# Patient Record
Sex: FEMALE | Race: WHITE | NOT HISPANIC OR LATINO | Employment: PART TIME | RURAL
[De-identification: names, ages, dates, MRNs, and addresses within clinical notes are randomized per-mention and may not be internally consistent; named-entity substitution may affect disease eponyms.]

---

## 2019-04-17 PROBLEM — L30.9 PERIORBITAL DERMATITIS: Status: ACTIVE | Noted: 2019-04-17

## 2019-04-18 PROBLEM — T36.8X5A VANCOMYCIN ADVERSE REACTION: Chronic | Status: ACTIVE | Noted: 2019-04-18

## 2019-04-18 PROBLEM — Z91.040 LATEX ALLERGY: Status: ACTIVE | Noted: 2019-04-18

## 2023-08-11 ENCOUNTER — OFFICE VISIT (OUTPATIENT)
Dept: FAMILY MEDICINE | Facility: CLINIC | Age: 33
End: 2023-08-11
Payer: COMMERCIAL

## 2023-08-11 VITALS
WEIGHT: 149.81 LBS | OXYGEN SATURATION: 99 % | DIASTOLIC BLOOD PRESSURE: 83 MMHG | HEIGHT: 60 IN | RESPIRATION RATE: 18 BRPM | HEART RATE: 64 BPM | SYSTOLIC BLOOD PRESSURE: 130 MMHG | BODY MASS INDEX: 29.41 KG/M2 | TEMPERATURE: 99 F

## 2023-08-11 DIAGNOSIS — B37.0 THRUSH: Primary | ICD-10-CM

## 2023-08-11 PROBLEM — G43.019 INTRACTABLE MIGRAINE WITHOUT AURA AND WITHOUT STATUS MIGRAINOSUS: Status: ACTIVE | Noted: 2022-05-24

## 2023-08-11 PROBLEM — R60.0 EXTREMITY EDEMA: Status: ACTIVE | Noted: 2022-05-24

## 2023-08-11 PROBLEM — N32.9 BLADDER DISORDER: Status: ACTIVE | Noted: 2023-08-11

## 2023-08-11 PROBLEM — R55 SYNCOPE AND COLLAPSE: Status: ACTIVE | Noted: 2022-05-24

## 2023-08-11 PROBLEM — R32 URINARY INCONTINENCE: Status: ACTIVE | Noted: 2022-05-24

## 2023-08-11 PROBLEM — R19.5 LOOSE STOOLS: Status: ACTIVE | Noted: 2022-05-24

## 2023-08-11 PROBLEM — M19.90 ARTHRITIS: Status: ACTIVE | Noted: 2023-08-11

## 2023-08-11 PROBLEM — R11.0 NAUSEA: Status: ACTIVE | Noted: 2022-05-24

## 2023-08-11 PROBLEM — I95.1 ORTHOSTATIC HYPOTENSION: Status: ACTIVE | Noted: 2022-05-24

## 2023-08-11 PROBLEM — G44.229 CHRONIC TENSION-TYPE HEADACHE, NOT INTRACTABLE: Status: ACTIVE | Noted: 2022-05-24

## 2023-08-11 PROBLEM — E55.9 VITAMIN D DEFICIENCY: Status: ACTIVE | Noted: 2022-05-24

## 2023-08-11 PROBLEM — R10.13 EPIGASTRIC PAIN: Status: ACTIVE | Noted: 2022-05-24

## 2023-08-11 PROBLEM — N39.3 SUI (STRESS URINARY INCONTINENCE, FEMALE): Status: ACTIVE | Noted: 2022-05-02

## 2023-08-11 PROBLEM — F41.8 DEPRESSION WITH ANXIETY: Status: ACTIVE | Noted: 2022-05-24

## 2023-08-11 PROBLEM — I47.10 SVT (SUPRAVENTRICULAR TACHYCARDIA): Status: ACTIVE | Noted: 2022-05-24

## 2023-08-11 PROBLEM — R53.82 CHRONIC FATIGUE: Status: ACTIVE | Noted: 2022-05-24

## 2023-08-11 PROBLEM — F33.0 MDD (MAJOR DEPRESSIVE DISORDER), RECURRENT EPISODE, MILD: Status: ACTIVE | Noted: 2022-05-24

## 2023-08-11 PROBLEM — F41.9 ANXIETY: Status: ACTIVE | Noted: 2022-08-16

## 2023-08-11 PROBLEM — K31.89 REACTIVE GASTROPATHY: Status: ACTIVE | Noted: 2022-05-24

## 2023-08-11 PROBLEM — Z00.01 ENCOUNTER FOR GENERAL ADULT MEDICAL EXAMINATION WITH ABNORMAL FINDINGS: Status: ACTIVE | Noted: 2022-05-24

## 2023-08-11 PROBLEM — G90.1 DYSAUTONOMIA: Status: ACTIVE | Noted: 2022-05-24

## 2023-08-11 PROBLEM — J35.01 CHRONIC TONSILLITIS: Status: ACTIVE | Noted: 2022-05-24

## 2023-08-11 PROCEDURE — 99203 OFFICE O/P NEW LOW 30 MIN: CPT | Mod: ,,, | Performed by: NURSE PRACTITIONER

## 2023-08-11 PROCEDURE — 99203 PR OFFICE/OUTPT VISIT, NEW, LEVL III, 30-44 MIN: ICD-10-PCS | Mod: ,,, | Performed by: NURSE PRACTITIONER

## 2023-08-11 RX ORDER — HYDROCHLOROTHIAZIDE 12.5 MG/1
1 CAPSULE ORAL EVERY MORNING
COMMUNITY
Start: 2023-07-12

## 2023-08-11 RX ORDER — SERTRALINE HYDROCHLORIDE 50 MG/1
50 TABLET, FILM COATED ORAL EVERY MORNING
COMMUNITY
Start: 2023-03-30

## 2023-08-11 RX ORDER — HYDROCHLOROTHIAZIDE 12.5 MG/1
12.5 CAPSULE ORAL EVERY MORNING
COMMUNITY
Start: 2023-07-12

## 2023-08-11 RX ORDER — UBROGEPANT 50 MG/1
50 TABLET ORAL DAILY PRN
COMMUNITY
Start: 2022-12-13

## 2023-08-11 RX ORDER — ONDANSETRON 8 MG/1
8 TABLET, ORALLY DISINTEGRATING ORAL EVERY 8 HOURS PRN
COMMUNITY
Start: 2023-04-13

## 2023-08-11 RX ORDER — FLUDROCORTISONE ACETATE 0.1 MG/1
1 TABLET ORAL EVERY MORNING
COMMUNITY
Start: 2023-08-07

## 2023-08-11 RX ORDER — ERENUMAB-AOOE 140 MG/ML
INJECTION, SOLUTION SUBCUTANEOUS
COMMUNITY

## 2023-08-11 RX ORDER — FREMANEZUMAB-VFRM 225 MG/1.5ML
INJECTION SUBCUTANEOUS
COMMUNITY
Start: 2023-06-14

## 2023-08-11 RX ORDER — FLUCONAZOLE 150 MG/1
TABLET ORAL
Qty: 3 TABLET | Refills: 1 | Status: SHIPPED | OUTPATIENT
Start: 2023-08-11

## 2023-08-11 RX ORDER — FREMANEZUMAB-VFRM 225 MG/1.5ML
225 INJECTION SUBCUTANEOUS
COMMUNITY
Start: 2023-08-07

## 2023-08-11 NOTE — PROGRESS NOTES
Subjective:       Patient ID: Qiana Waite is a 33 y.o. female.    Chief Complaint: Thrush    Presents to clinic as above. Takes chronic steroids for dysautonomia. She gets thrush several times per year as a result.      Review of Systems   Constitutional: Negative.    HENT:  Positive for sore throat.    Respiratory: Negative.     Cardiovascular: Negative.           Reviewed family, medical, surgical, and social history.    Objective:      /83 (BP Location: Left arm, Patient Position: Sitting, BP Method: Medium (Automatic))   Pulse 64   Temp 98.7 °F (37.1 °C) (Oral)   Resp 18   Ht 5' (1.524 m)   Wt 67.9 kg (149 lb 12.8 oz)   SpO2 99%   BMI 29.26 kg/m²   Physical Exam  Vitals and nursing note reviewed.   Constitutional:       General: She is not in acute distress.     Appearance: Normal appearance. She is not ill-appearing, toxic-appearing or diaphoretic.   HENT:      Head: Normocephalic.      Mouth/Throat:      Mouth: Mucous membranes are moist.      Pharynx: Posterior oropharyngeal erythema present.      Comments: Mild redness to posterior pharynx on left. Adherent white patches on buccal mucosa  Cardiovascular:      Rate and Rhythm: Normal rate and regular rhythm.      Heart sounds: Normal heart sounds.   Pulmonary:      Effort: Pulmonary effort is normal.      Breath sounds: Normal breath sounds.   Musculoskeletal:      Cervical back: Normal range of motion and neck supple.   Skin:     General: Skin is warm and dry.      Capillary Refill: Capillary refill takes less than 2 seconds.   Neurological:      Mental Status: She is alert and oriented to person, place, and time.   Psychiatric:         Mood and Affect: Mood normal.         Behavior: Behavior normal.         Thought Content: Thought content normal.         Judgment: Judgment normal.            No visits with results within 1 Day(s) from this visit.   Latest known visit with results is:   No results found for any previous visit.       Assessment:       1. Thrush        Plan:       Thrush  -     fluconazole (DIFLUCAN) 150 MG Tab; Take 1 po every 3 days for 3 doses  Dispense: 3 tablet; Refill: 1    RTC PRN          Risks, benefits, and side effects were discussed with the patient. All questions were answered to the fullest satisfaction of the patient, and pt verbalized understanding and agreement to treatment plan. Pt was to call with any new or worsening symptoms, or present to the ER.

## 2023-11-13 PROBLEM — Z00.01 ENCOUNTER FOR GENERAL ADULT MEDICAL EXAMINATION WITH ABNORMAL FINDINGS: Status: RESOLVED | Noted: 2022-05-24 | Resolved: 2023-11-13
